# Patient Record
Sex: MALE | Race: WHITE
[De-identification: names, ages, dates, MRNs, and addresses within clinical notes are randomized per-mention and may not be internally consistent; named-entity substitution may affect disease eponyms.]

---

## 2021-05-03 ENCOUNTER — HOSPITAL ENCOUNTER (EMERGENCY)
Dept: HOSPITAL 63 - ER | Age: 31
Discharge: HOME | End: 2021-05-03
Payer: OTHER GOVERNMENT

## 2021-05-03 VITALS — BODY MASS INDEX: 30.58 KG/M2 | HEIGHT: 66 IN | WEIGHT: 190.26 LBS

## 2021-05-03 VITALS — SYSTOLIC BLOOD PRESSURE: 134 MMHG | DIASTOLIC BLOOD PRESSURE: 79 MMHG

## 2021-05-03 DIAGNOSIS — R00.0: ICD-10-CM

## 2021-05-03 DIAGNOSIS — R53.1: ICD-10-CM

## 2021-05-03 DIAGNOSIS — R00.2: Primary | ICD-10-CM

## 2021-05-03 DIAGNOSIS — F17.220: ICD-10-CM

## 2021-05-03 DIAGNOSIS — R06.02: ICD-10-CM

## 2021-05-03 LAB
ALBUMIN SERPL-MCNC: 4.5 G/DL (ref 3.4–5)
ALBUMIN/GLOB SERPL: 1.7 {RATIO} (ref 1–1.7)
ALP SERPL-CCNC: 55 U/L (ref 46–116)
ALT SERPL-CCNC: 43 U/L (ref 16–63)
ANION GAP SERPL CALC-SCNC: 13 MMOL/L (ref 6–14)
AST SERPL-CCNC: 21 U/L (ref 15–37)
BASOPHILS # BLD AUTO: 0 X10^3/UL (ref 0–0.2)
BASOPHILS NFR BLD: 1 % (ref 0–3)
BILIRUB SERPL-MCNC: 0.5 MG/DL (ref 0.2–1)
BUN/CREAT SERPL: 12 (ref 6–20)
CA-I SERPL ISE-MCNC: 15 MG/DL (ref 8–26)
CALCIUM SERPL-MCNC: 9.4 MG/DL (ref 8.5–10.1)
CHLORIDE SERPL-SCNC: 104 MMOL/L (ref 98–107)
CO2 SERPL-SCNC: 24 MMOL/L (ref 21–32)
CREAT SERPL-MCNC: 1.3 MG/DL (ref 0.7–1.3)
EOSINOPHIL NFR BLD: 0.2 X10^3/UL (ref 0–0.7)
EOSINOPHIL NFR BLD: 3 % (ref 0–3)
ERYTHROCYTE [DISTWIDTH] IN BLOOD BY AUTOMATED COUNT: 12.4 % (ref 11.5–14.5)
GFR SERPLBLD BASED ON 1.73 SQ M-ARVRAT: 63.6 ML/MIN
GLOBULIN SER-MCNC: 2.6 G/DL (ref 2.2–3.8)
GLUCOSE SERPL-MCNC: 122 MG/DL (ref 70–99)
HCT VFR BLD CALC: 45.1 % (ref 39–53)
HGB BLD-MCNC: 15.5 G/DL (ref 13–17.5)
LYMPHOCYTES # BLD: 3.2 X10^3/UL (ref 1–4.8)
LYMPHOCYTES NFR BLD AUTO: 36 % (ref 24–48)
MCH RBC QN AUTO: 31 PG (ref 25–35)
MCHC RBC AUTO-ENTMCNC: 34 G/DL (ref 31–37)
MCV RBC AUTO: 89 FL (ref 79–100)
MONO #: 0.6 X10^3/UL (ref 0–1.1)
MONOCYTES NFR BLD: 7 % (ref 0–9)
NEUT #: 4.7 X10^3UL (ref 1.8–7.7)
NEUTROPHILS NFR BLD AUTO: 54 % (ref 31–73)
PLATELET # BLD AUTO: 326 X10^3/UL (ref 140–400)
POTASSIUM SERPL-SCNC: 3.6 MMOL/L (ref 3.5–5.1)
PROT SERPL-MCNC: 7.1 G/DL (ref 6.4–8.2)
RBC # BLD AUTO: 5.1 X10^6/UL (ref 4.3–5.7)
SODIUM SERPL-SCNC: 141 MMOL/L (ref 136–145)
WBC # BLD AUTO: 8.8 X10^3/UL (ref 4–11)

## 2021-05-03 PROCEDURE — 84484 ASSAY OF TROPONIN QUANT: CPT

## 2021-05-03 PROCEDURE — 85025 COMPLETE CBC W/AUTO DIFF WBC: CPT

## 2021-05-03 PROCEDURE — 96360 HYDRATION IV INFUSION INIT: CPT

## 2021-05-03 PROCEDURE — 71045 X-RAY EXAM CHEST 1 VIEW: CPT

## 2021-05-03 PROCEDURE — 93005 ELECTROCARDIOGRAM TRACING: CPT

## 2021-05-03 PROCEDURE — 80053 COMPREHEN METABOLIC PANEL: CPT

## 2021-05-03 PROCEDURE — 99285 EMERGENCY DEPT VISIT HI MDM: CPT

## 2021-05-03 PROCEDURE — 36415 COLL VENOUS BLD VENIPUNCTURE: CPT

## 2021-05-03 NOTE — PHYS DOC
Past History


Past Medical History:  No Pertinent History


Past Surgical History:  No Surgical History


Additional Smoking Information:  


chews tobacco


Alcohol Use:  None





General Adult


EDM:


Chief Complaint:  Palpitations





HPI:


HPI:


31-year-old male presents with palpitations.  He was sitting getting ready for 

class to start [place] the local  [place] when he started to feel like he had a 

fast heart rate.  His watch told him his heart rate got as high as 170.  Patient

then began to feel little bit short of breath and had some lower extremity 

weakness bilaterally.  Yesterday, he had some intermittent chest pain which he 

describes as a mild to moderate pressure sensation.  He does not have chest pain

today.  The patient does not have a cardiac history.  There is cardiac and 

thyroid history in his family.  He denies fever or chills.  He does not drink 

very much caffeine.  He denies any drug or alcohol use.  He does use chewing 

tobacco but does not vape.





Review of Systems:


Review of Systems:


Constitutional:  Denies fever or chills 


Eyes:  Denies change in visual acuity 


HENT:  Denies nasal congestion or sore throat 


Respiratory:  Denies cough or shortness of breath 


Cardiovascular: Palpitations


GI:  Denies abdominal pain, nausea, vomiting, bloody stools or diarrhea 


: Denies dysuria 


Musculoskeletal:  Denies back pain or joint pain 


Integument:  Denies rash 


Neurologic:  Denies headache, focal weakness or sensory changes 


Endocrine:  Denies polyuria or polydipsia 


Lymphatic:  Denies swollen glands 


Psychiatric:  Denies depression or anxiety





Allergies:


Allergies:





Allergies








Coded Allergies Type Severity Reaction Last Updated Verified


 


  No Known Drug Allergies    5/3/21 No











Physical Exam:


PE:





Constitutional: Well developed, well nourished, no acute distress, non-toxic 

appearance. []


HENT: Normocephalic, atraumatic, bilateral external ears normal, oropharynx 

moist, no oral exudates, nose normal. []


Eyes: PERRLA, EOMI, conjunctiva normal, no discharge. [] 


Neck: Normal range of motion, no tenderness, supple, no stridor. [] 


Cardiovascular: Heart rate 95, regular rhythm, no murmur []


Lungs & Thorax:  Bilateral breath sounds clear to auscultation []


Abdomen: Bowel sounds normal, soft, no tenderness, no masses, no pulsatile 

masses. [] 


Skin: Warm, dry, no erythema, no rash. [] 


Back: No tenderness, no CVA tenderness. [] 


Extremities: No tenderness, no cyanosis, no clubbing, ROM intact, no edema. [] 


Neurologic: Alert and oriented X 3, normal motor function, normal sensory funct

ion, no focal deficits noted. []


Psychologic: Affect normal, judgement normal, mood normal. []





Current Patient Data:


Labs:





                                Laboratory Tests








Test


 5/3/21


11:10


 


White Blood Count


 8.8 x10^3/uL


(4.0-11.0)


 


Red Blood Count


 5.10 x10^6/uL


(4.30-5.70)


 


Hemoglobin


 15.5 g/dL


(13.0-17.5)


 


Hematocrit


 45.1 %


(39.0-53.0)


 


Mean Corpuscular Volume


 89 fL ()





 


Mean Corpuscular Hemoglobin 31 pg (25-35)  


 


Mean Corpuscular Hemoglobin


Concent 34 g/dL


(31-37)


 


Red Cell Distribution Width


 12.4 %


(11.5-14.5)


 


Platelet Count


 326 x10^3/uL


(140-400)


 


Neutrophils (%) (Auto) 54 % (31-73)  


 


Lymphocytes (%) (Auto) 36 % (24-48)  


 


Monocytes (%) (Auto) 7 % (0-9)  


 


Eosinophils (%) (Auto) 3 % (0-3)  


 


Basophils (%) (Auto) 1 % (0-3)  


 


Neutrophils # (Auto)


 4.7 x10^3uL


(1.8-7.7)


 


Lymphocytes # (Auto)


 3.2 x10^3/uL


(1.0-4.8)


 


Monocytes # (Auto)


 0.6 x10^3/uL


(0.0-1.1)


 


Eosinophils # (Auto)


 0.2 x10^3/uL


(0.0-0.7)


 


Basophils # (Auto)


 0.0 x10^3/uL


(0.0-0.2)


 


Sodium Level


 141 mmol/L


(136-145)


 


Potassium Level


 3.6 mmol/L


(3.5-5.1)


 


Chloride Level


 104 mmol/L


()


 


Carbon Dioxide Level


 24 mmol/L


(21-32)


 


Anion Gap 13 (6-14)  


 


Blood Urea Nitrogen


 15 mg/dL


(8-26)


 


Creatinine


 1.3 mg/dL


(0.7-1.3)


 


Estimated GFR


(Cockcroft-Gault) 63.6  





 


BUN/Creatinine Ratio 12 (6-20)  


 


Glucose Level


 122 mg/dL


(70-99)  H


 


Calcium Level


 9.4 mg/dL


(8.5-10.1)


 


Total Bilirubin Pending  


 


Aspartate Amino Transferase


(AST) Pending  





 


Alanine Aminotransferase (ALT) Pending  


 


Alkaline Phosphatase Pending  


 


Total Protein Pending  


 


Albumin Pending  


 


Albumin/Globulin Ratio Pending  








Vital Signs:





                                   Vital Signs








  Date Time  Temp Pulse Resp B/P (MAP) Pulse Ox O2 Delivery O2 Flow Rate FiO2


 


5/3/21 10:59 98.5 120 16  95 Room Air  











EKG:


EKG:


Sinus tachycardia, rate 102, normal axis, no ST elevation or depression.  []





Radiology/Procedures:


Radiology/Procedures:


[]


Impressions:


XR CHEST 1V





CLINICAL INDICATIONS: Reason: HEART RACING. SHORTNESS OF BREATH X1 DAY   





COMPARISON: None available.





Findings: No acute lung infiltrate or pleural effusion or pulmonary edema or 

lung mass or pneumothorax is seen.  The heart size, pulmonary vasculature, 

mediastinum and both frederic are unremarkable. Old healed right clavicular fracture

 and right eighth rib fracture is seen. 





IMPRESSION: No acute radiographic abnormality is seen.





Electronically signed by: Meli Irene MD (5/3/2021 11:46 AM) [id number]














DICTATED AND SIGNED BY:     MELI IRENE MD


DATE:     05/03/21 1145





CC: GO REILLY DO; EMMA SONG DO ~MTH0 0





Heart Score:


C/O Chest Pain:  No


Risk Factors:


Risk Factors:  DM, Current or recent (<one month) smoker, HTN, HLP, family 

history of CAD, obesity.


Risk Scores:


Score 0 - 3:  2.5% MACE over next 6 weeks - Discharge Home


Score 4 - 6:  20.3% MACE over next 6 weeks - Admit for Clinical Observation


Score 7 - 10:  72.7% MACE over next 6 weeks - Early Invasive Strategies





Course & Med Decision Making:


Course & Med Decision Making


Pertinent Labs and Imaging studies reviewed. (See chart for details)


The patient's labs are unremarkable.  His EKG is unremarkable except for slight 

tachycardia.  His heart rate has improved while his been in the emergency room. 

 All we have given him is about 300 mL of fluid thus far.  He has not had any 

other obvious symptoms.  I have advised that he follow-up with his primary 

physician.  If he has further episodes like this, he could consider an event 

monitor.  Patient states verbal understanding.  He is comfortable with 

discharge.  He is stable for discharge at this time.


[]





Dragon Disclaimer:


Dragon Disclaimer:


This electronic medical record was generated, in whole or in part, using a voice

 recognition dictation system.





Departure


Departure:


Impression:  


   Primary Impression:  


   Palpitations


Disposition:  01 HOME / SELF CARE / HOMELESS


Condition:  STABLE


Referrals:  


EMMA SONG DO (PCP)


Patient Instructions:  Palpitations, Easy-to-Read











GO REILLY DO                  May 3, 2021 11:49

## 2021-05-03 NOTE — EKG
[facility] [address], [place] [address]

Test Date:    2021               Test Time:    11:48:30

Pat Name:     LAY MONK             Department:   

Patient ID:   [id number]           Room:          

Gender:       M                        Technician:   CASANDRA

:          1990               Requested By: GO REILLY

Order Number: [id number]            Reading MD:     

                                 Measurements

Intervals                              Axis          

Rate:         102                      P:            49

KS:           156                      QRS:          -2

QRSD:         82                       T:            10

QT:           324                                    

QTc:          426                                    

                           Interpretive Statements

SINUS TACHYCARDIA

LEFTWARD AXIS

OTHERWISE NORMAL ECG

RI6.02

No previous ECG available for comparison

## 2021-05-03 NOTE — RAD
XR CHEST 1V



CLINICAL INDICATIONS: Reason: HEART RACING. SHORTNESS OF BREATH X1 DAY   



COMPARISON: None available.



Findings: No acute lung infiltrate or pleural effusion or pulmonary edema or lung mass or pneumothora
x is seen.  The heart size, pulmonary vasculature, mediastinum and both frederic are unremarkable. Old he
aled right clavicular fracture and right eighth rib fracture is seen. 



IMPRESSION: No acute radiographic abnormality is seen.



Electronically signed by: Valentín Irene MD (5/3/2021 11:46 AM) [id number]